# Patient Record
Sex: FEMALE | URBAN - METROPOLITAN AREA
[De-identification: names, ages, dates, MRNs, and addresses within clinical notes are randomized per-mention and may not be internally consistent; named-entity substitution may affect disease eponyms.]

---

## 2022-01-06 ENCOUNTER — MEDICAL CORRESPONDENCE (OUTPATIENT)
Dept: HEALTH INFORMATION MANAGEMENT | Facility: CLINIC | Age: 67
End: 2022-01-06

## 2022-01-07 ENCOUNTER — PRE VISIT (OUTPATIENT)
Dept: UROLOGY | Facility: CLINIC | Age: 67
End: 2022-01-07

## 2023-10-05 NOTE — CONFIDENTIAL NOTE
Action 01/07/22 MMT   Action Taken  CSS received incoming referral and records from [facility] to Dr. Irene. Documents sent to scanning and forwarded to referral team to schedule as patient is not yet scheduled.         Ortho Consult Note    Consulting Physician: Dr. Delong     Chief Complaint: Left hip pain    HPI:  Patient is a 79-year-old female who presents emergency room with chief complaint of left hip pain after a ground-level fall earlier today.  Patient notes immediate significant pain and inability to bear weight to the left lower extremity.  The patient has a history of right intertrochanteric hip fracture status post intramedullary nailing with Dr. Gordon on 5/21/2023.  Patient also has a history of cervical myelopathy status post C1 laminectomy, C3-C7 laminoplasty with Dr. Shook on 6/1/2023.  Patient ambulates with assistance of a walker at baseline.    Medical History  Past Medical History:   Diagnosis Date   • Anemia    • Anxiety    • Blood transfusion without reported diagnosis    • Chronic pain    • Depression    • Diabetes mellitus (CMD)    • Essential (primary) hypertension    • Gastroesophageal reflux disease    • Hip fracture, right, closed, initial encounter (CMD) 5/20/2023   • Primary osteoarthritis of both hips 09/23/2016        Past Surgical History  Past Surgical History:   Procedure Laterality Date   • Breast surgery     • Laminectomy,cervical  06/01/2023    c1   • Orif hip fracture  05/21/2023   • Total knee replacement Right         Home Medications  Current Facility-Administered Medications   Medication   • sodium chloride 0.9 % flush bag 25 mL   • sodium chloride 0.9 % injection 2 mL   • gabapentin (NEURONTIN) capsule 100 mg   • [START ON 10/5/2023] pantoprazole (PROTONIX) EC tablet 40 mg   • lactated ringers infusion   • dextrose 50 % injection 25 g   • dextrose 50 % injection 12.5 g   • glucagon (GLUCAGEN) injection 1 mg   • dextrose (GLUTOSE) 40 % gel 15 g   • dextrose (GLUTOSE) 40 % gel 30 g   • insulin lispro (ADMELOG,HumaLOG) - Correction Dose   • heparin (porcine) injection 5,000 Units   • acetaminophen (TYLENOL) tablet 650 mg   • cyclobenzaprine (FLEXERIL) tablet 5 mg   • traMADol (ULTRAM)  tablet 25 mg     Current Outpatient Medications   Medication Sig Dispense Refill   • apixaBAN (Eliquis) 5 MG Tab Take 5 mg by mouth every 12 hours.     • oxybutynin (DITROPAN-XL) 10 MG 24 hr tablet Take 10 mg by mouth daily.     • acetaminophen (TYLENOL) 500 MG tablet Take 500 mg by mouth 2 times daily as needed for Pain.     • ibuprofen (MOTRIN) 200 MG tablet Take 200 mg by mouth daily as needed for Pain.     • hydrALAZINE (APRESOLINE) 25 MG tablet Take 1 tablet by mouth in the morning and 1 tablet in the evening. 60 tablet 0   • Myrbetriq 50 MG 24 hr tablet Take 50 mg by mouth daily. (Patient not taking: Reported on 10/4/2023)     • Docusate Calcium (STOOL SOFTENER PO) Take 1 capsule by mouth daily as needed.     • amLODIPine (NORVASC) 10 MG tablet Take 10 mg by mouth daily.     • Continuous Blood Gluc Sensor (FreeStyle Morenita 14 Day Sensor) Misc Check blood sugars daily and prn 2 each 5   • Insulin Pen Needle (BD Pen Needle Mayela U/F) 32G X 4 MM Misc Use to inject insulin 1 times daily. Remove needle cover(s) to expose needle before injecting. 30 each 3   • alendronate (FOSAMAX) 70 MG tablet Take 1 tablet by mouth every 7 days. 12 tablet 3   • Ferrous Sulfate (Iron) 325 (65 Fe) MG Tab Take 1 tablet by mouth in the morning and 1 tablet in the evening. 180 tablet 3   • gabapentin (NEURONTIN) 100 MG capsule Take 1 capsule by mouth at bedtime. (Patient taking differently: Take 100 mg by mouth daily.) 90 capsule 3   • insulin degludec (TRESIBA FLEXTOUCH) 100 UNIT/ML pen-injector Inject 14 Units into the skin daily. Prime 2 units before each dose. 15 mL 1   • Ascorbic Acid (vitamin C) 500 MG tablet Take 500 mg by mouth daily.     • pantoprazole (PROTONIX) 40 MG tablet 40 mg DAILY (Patient taking differently: Take 40 mg by mouth. 40 mg DAILY) 90 tablet 3       Allergies  ALLERGIES:  No Known Allergies    Social  See HPI      Family  Non-contributory      Objective      Physical Exam:  General: NAD.  HEENT:  normocephalic  CV: RR  Respiratory: non-labored respirations     RUE:  Inspection: Well-healed dorsal wrist incision. no deformities, swelling, warmth, erythema, ecchymosis, or lacerations.  Compartments soft  No pain with PROM shoulder, elbow, wrist, and fingers.  NTTP clavicle, shoulder, elbow, wrist, and hand.   SILT axillary, radial, ulnar, and median.   AIN/PIN/ulnar motor n.n. intact. +TU/OK/2-3x.  WWP, 2+ radial pulse, capillary refill <3 seconds.    LUE:  Inspection: No deformities, swelling, warmth, erythema, ecchymosis, or lacerations.  Compartments soft  No pain with PROM shoulder, elbow, wrist, and fingers.  NTTP clavicle, shoulder, elbow, wrist, and hand.   SILT axillary, radial, ulnar, and median.   Strength 5/5 - Deltoid/Bicep/Tricep/Wrist extension/  AIN/PIN/ulnar motor n.n. intact. +TU/OK/2-3x.  WWP, 2+ radial pulse, capillary refill <3 seconds.      RLE:  Inspection: No deformities, swelling, warmth, erythema, ecchymosis, or lacerations.  Well-healed lateral incisions status post intramedullary nailing  No pain with PROM hip, knee, and ankle.  NTTP hip, femur, knee, leg, ankle, and foot.   Compartments soft.  SILT S/S/SPN/DPN/M/L.   +TA/GS/EHL/FHL.  WWP, palpable dorsalis pedis pulse, capillary refill <3 seconds.    LLE:  Inspection: Extremity is shortened and externally rotated.  No gross deformity, swelling, lacerations   no pain with   Range of motion deferred in the context of known femoral neck fracture  Tenderness to palpation of the hip.  Extremities otherwise nontender palpation  Pain with logroll.   Compartments soft.  SILT S/S/SPN/DPN/M/L.   +TA/GS/EHL/FHL.  WWP, palpable dorsalis pedis pulse, capillary refill <3 seconds.    Labs:  Lab Results   Component Value Date    WBC 12.7 (H) 10/04/2023    WBC 3.8 (L) 11/25/2020    HGB 11.1 (L) 10/04/2023    HGB 10.6 (L) 11/25/2020    HCT 35.7 (L) 10/04/2023    HCT 35.2 (L) 11/25/2020     10/04/2023     11/25/2020     Lab Results    Component Value Date    GLUCOSE 282 (H) 10/04/2023    GLUCOSE 191 (H) 11/25/2020    BUN 78 (H) 10/04/2023    BUN 44 (H) 11/25/2020     Lab Results   Component Value Date    INR 1.0 10/04/2023    PTT 32 10/04/2023    PTT 28 08/08/2023       Imaging  X-ray imaging of the left hip and pelvis demonstrates femoral neck fracture with shortening, varus angulation.  There is the proximal aspect of a long cephalomedullary nail utilized.    2 views of the left femur redemonstrates the above-stated femoral neck fracture.  There is no distal hardware or additional fractures appreciated    3 views the right ankle demonstrate no evidence of acute fracture or dislocation      3 views of the left humerus demonstrate no evidence of acute fracture or dislocation      Assessment and Plan    Darren Fritz 79 year old female presenting with left femoral neck fracture after ground-level fall.  Patient has a history of right intertrochanteric hip fracture  status post intramedullary nailing with Dr. Gordon on 5/21/2023    Tenative plan for OR 10/5/23    -Admit to medicine for medical clearance and optimization; please document surgical risk stratification  -Activity: Bedrest  -Weight bearing: NWB left lower extremity  -Abx:  -Diet: NPO at midnight  -Moya  -Pre-op labs: CBC, BMP, T&S  -EKG/CXR  -Anticoagulation: Per primary team.  No need to hold from orthopedic perspective  -Disposition: Pending further management  -All questions were answered  -Will d/w attending Dr. Megan Garcia MD  Orthopaedic Surgery, PGY- 3